# Patient Record
Sex: FEMALE | Race: WHITE | NOT HISPANIC OR LATINO | Employment: OTHER | URBAN - METROPOLITAN AREA
[De-identification: names, ages, dates, MRNs, and addresses within clinical notes are randomized per-mention and may not be internally consistent; named-entity substitution may affect disease eponyms.]

---

## 2021-09-14 ENCOUNTER — TRANSCRIBE ORDERS (OUTPATIENT)
Dept: ADMINISTRATIVE | Facility: HOSPITAL | Age: 76
End: 2021-09-14

## 2021-09-14 DIAGNOSIS — Z13.6 SCREENING FOR ISCHEMIC HEART DISEASE: Primary | ICD-10-CM

## 2021-09-24 ENCOUNTER — HOSPITAL ENCOUNTER (OUTPATIENT)
Dept: CT IMAGING | Facility: HOSPITAL | Age: 76
Discharge: HOME | End: 2021-09-24
Admitting: NURSE PRACTITIONER

## 2021-09-24 ENCOUNTER — APPOINTMENT (OUTPATIENT)
Dept: CT IMAGING | Facility: HOSPITAL | Age: 76
End: 2021-09-24

## 2021-09-24 DIAGNOSIS — Z13.6 SCREENING FOR ISCHEMIC HEART DISEASE: ICD-10-CM

## 2021-09-24 PROCEDURE — 75571 CT HRT W/O DYE W/CA TEST: CPT

## 2021-11-03 ENCOUNTER — OFFICE VISIT (OUTPATIENT)
Dept: CARDIOLOGY | Facility: CLINIC | Age: 76
End: 2021-11-03

## 2021-11-03 VITALS
SYSTOLIC BLOOD PRESSURE: 158 MMHG | DIASTOLIC BLOOD PRESSURE: 95 MMHG | BODY MASS INDEX: 28.16 KG/M2 | WEIGHT: 169 LBS | HEIGHT: 65 IN | HEART RATE: 72 BPM

## 2021-11-03 DIAGNOSIS — I10 ESSENTIAL HYPERTENSION: ICD-10-CM

## 2021-11-03 DIAGNOSIS — I25.10 CORONARY ARTERY DISEASE INVOLVING NATIVE CORONARY ARTERY OF NATIVE HEART WITHOUT ANGINA PECTORIS: Primary | ICD-10-CM

## 2021-11-03 DIAGNOSIS — E78.5 DYSLIPIDEMIA: ICD-10-CM

## 2021-11-03 PROBLEM — E11.9 TYPE 2 DIABETES MELLITUS: Status: ACTIVE | Noted: 2017-12-27

## 2021-11-03 PROCEDURE — 93000 ELECTROCARDIOGRAM COMPLETE: CPT | Performed by: INTERNAL MEDICINE

## 2021-11-03 PROCEDURE — 99204 OFFICE O/P NEW MOD 45 MIN: CPT | Performed by: INTERNAL MEDICINE

## 2021-11-03 RX ORDER — VALSARTAN AND HYDROCHLOROTHIAZIDE 160; 12.5 MG/1; MG/1
1 TABLET, FILM COATED ORAL DAILY
COMMUNITY
Start: 2021-11-01

## 2021-11-03 RX ORDER — SEMAGLUTIDE 1.34 MG/ML
INJECTION, SOLUTION SUBCUTANEOUS
COMMUNITY
Start: 2021-10-04 | End: 2022-11-16

## 2021-11-03 RX ORDER — MELATONIN 10 MG
CAPSULE ORAL NIGHTLY
COMMUNITY

## 2021-11-03 RX ORDER — METFORMIN HYDROCHLORIDE 500 MG/1
TABLET, EXTENDED RELEASE ORAL 2 TIMES DAILY
COMMUNITY
Start: 2021-11-01

## 2021-11-03 RX ORDER — PRAVASTATIN SODIUM 40 MG
40 TABLET ORAL DAILY
COMMUNITY
Start: 2021-11-01

## 2021-11-03 RX ORDER — METOPROLOL SUCCINATE 25 MG/1
25 TABLET, EXTENDED RELEASE ORAL DAILY
COMMUNITY
Start: 2021-11-01

## 2021-11-03 RX ORDER — DIPHENHYDRAMINE HCL 50 MG
50 CAPSULE ORAL NIGHTLY
COMMUNITY

## 2021-11-03 RX ORDER — LEVOTHYROXINE SODIUM 75 MCG
TABLET ORAL DAILY
COMMUNITY
Start: 2021-11-01

## 2021-11-03 RX ORDER — ASPIRIN 81 MG/1
81 TABLET ORAL DAILY
COMMUNITY

## 2021-11-03 RX ORDER — DENOSUMAB 60 MG/ML
1 INJECTION SUBCUTANEOUS
COMMUNITY

## 2021-11-03 RX ORDER — IBUPROFEN 600 MG/1
TABLET ORAL AS NEEDED
COMMUNITY

## 2021-11-03 NOTE — PROGRESS NOTES
Chief Complaint  Aortic Stenosis, Hypertension, and Hyperlipidemia      History of Present Illness  Misty Canales presents to [facility]  Patient is a 76-year-old female with a previous history of diabetes, dyslipidemia and hypertension who was undergone a CT scan screening purposes was found to have some coronary calcification.  She denies any ongoing anginal chest pain stable mild dyspnea on exertion symptoms.  Denies any PND orthopnea or other complaints    Past Medical History:   Diagnosis Date   • Diabetes mellitus (HCC)    • Dyslipidemia 11/3/2021   • Essential hypertension 3/17/2017   • Hyperlipidemia    • Hypertension    • Sleep apnea    • Type 2 diabetes mellitus (HCC) 12/27/2017         Current Outpatient Medications:   •  aspirin (aspirin) 81 MG EC tablet, Take 81 mg by mouth Daily., Disp: , Rfl:   •  denosumab (Prolia) 60 MG/ML solution prefilled syringe syringe, 1 mL., Disp: , Rfl:   •  diphenhydrAMINE (BENADRYL) 50 MG capsule, Take 50 mg by mouth Every Night., Disp: , Rfl:   •  ibuprofen (ADVIL,MOTRIN) 600 MG tablet, As Needed., Disp: , Rfl:   •  Melatonin 10 MG capsule, Take  by mouth Every Night., Disp: , Rfl:   •  metFORMIN ER (GLUCOPHAGE-XR) 500 MG 24 hr tablet, 2 (Two) Times a Day., Disp: , Rfl:   •  metoprolol succinate XL (TOPROL-XL) 25 MG 24 hr tablet, Take 25 mg by mouth Daily., Disp: , Rfl:   •  Ozempic, 0.25 or 0.5 MG/DOSE, 2 MG/1.5ML solution pen-injector, INJECT 0.25MG SUBCUTANEOUSLY ONCE WEEKLY, Disp: , Rfl:   •  pravastatin (PRAVACHOL) 40 MG tablet, Take 40 mg by mouth Daily., Disp: , Rfl:   •  Synthroid 75 MCG tablet, Daily., Disp: , Rfl:   •  valsartan-hydrochlorothiazide (DIOVAN-HCT) 160-12.5 MG per tablet, Take 1 tablet by mouth Daily., Disp: , Rfl:     There are no discontinued medications.  Allergies   Allergen Reactions   • Atorvastatin Other (See Comments)     Felt like she was dying   [place] awful        Social History     Tobacco Use   • Smoking status:  "Former Smoker     Packs/day: 0.50     Types: Cigarettes     Start date:      Quit date:      Years since quittin.8   • Smokeless tobacco: Never Used   • Tobacco comment: stopped in between    Vaping Use   • Vaping Use: Never used   Substance Use Topics   • Alcohol use: Never   • Drug use: Never       Family History   Problem Relation Age of Onset   • No Known Problems Mother    • Heart attack Father         Objective     /95   Pulse 72   Ht 165.1 cm (65\")   Wt 76.7 kg (169 lb)   BMI 28.12 kg/m²       Physical Exam    General Appearance:   · no acute distress  · Alert and oriented x3  HENT:   · lips not cyanotic  · Atraumatic  Neck:  · No jvd   · supple  Respiratory:  · no respiratory distress  · normal breath sounds  · no rales  Cardiovascular:  · Regular rate and rhythm  · no S3, no S4   · no murmur  · no rub  Extremities  · No cyanosis  · lower extremity edema: +1    Skin:   · warm, dry  · No rashes    Result Review :     No results found for: PROBNP    Creatinine 0.9  Potassium 3.9   Hemoglobin A1c 6.1    No results found for: TSH   No results found for: FREET4   No results found for: DDIMERQUANT  No results found for: MG   No results found for: DIGOXIN   No results found for: TROPONINT   No results found for: POCTROP(                    ECG 12 Lead    Date/Time: 11/3/2021 12:50 PM  Performed by: Rudi Hernandez MD  Authorized by: Rudi Hernandez MD   Comparison: not compared with previous ECG   Rhythm: sinus rhythm  Comments: Consider old anteroseptal wall MI           No results found for this or any previous visit.           10/21Echocardiogram EF 70%  Moderate left ventricular perjury  Mild left atrial lodgment    10/21  Nuclear stress test no reversible ischemia seen  EF 84%      CT calcium score   1. Total calcium score 109. Definite, at least moderate atherosclerotic plaque.  Mild coronary   artery disease highly likely, significant narrowings possible.  Recommend follow-up with " primary   care physician or cardiologist for risk factor modification and further workup as needed.      2. Additional findings as given above.        Diagnoses and all orders for this visit:    1. Coronary artery disease involving native coronary artery of native heart without angina pectoris (Primary)  Assessment & Plan:  Patient with a elevated coronary calcium score but no ongoing anginal discomfort.  She underwent stress testing which showed no ischemic burden.  No further testing is indicated at this point will continue with aspirin 81 mg preventatively as well as intensive statin lowering for goal LDL less than 70      2. Dyslipidemia  Assessment & Plan:  Patient is on statin Pravachol 40 mg daily tolerating well will obtain her most recent lipid levels titrating for a desired level of less than 70      3. Essential hypertension  Assessment & Plan:  Mild elevation in office at home patient states running in the 130s range encourage continued monitoring.  Counseled patient on  • low-sodium diet of less than 2 g  • Aerobic activity 30 minutes a day 5 times a week  • Weight loss          Other orders  -     ECG 12 Lead    Follow Up     Return in about 6 months (around 5/3/2022) for Follow with Alana Jane.          Patient was given instructions and counseling regarding her condition or for health maintenance advice. Please see specific information pulled into the AVS if appropriate.

## 2021-11-03 NOTE — ASSESSMENT & PLAN NOTE
Mild elevation in office at home patient states running in the 130s range encourage continued monitoring.  Counseled patient on  • low-sodium diet of less than 2 g  • Aerobic activity 30 minutes a day 5 times a week  • Weight loss

## 2021-11-03 NOTE — ASSESSMENT & PLAN NOTE
Patient is on statin Pravachol 40 mg daily tolerating well will obtain her most recent lipid levels titrating for a desired level of less than 70

## 2021-11-03 NOTE — ASSESSMENT & PLAN NOTE
Patient with a elevated coronary calcium score but no ongoing anginal discomfort.  She underwent stress testing which showed no ischemic burden.  No further testing is indicated at this point will continue with aspirin 81 mg preventatively as well as intensive statin lowering for goal LDL less than 70

## 2022-05-04 ENCOUNTER — OFFICE VISIT (OUTPATIENT)
Dept: CARDIOLOGY | Facility: CLINIC | Age: 77
End: 2022-05-04

## 2022-05-04 VITALS
SYSTOLIC BLOOD PRESSURE: 140 MMHG | DIASTOLIC BLOOD PRESSURE: 70 MMHG | HEART RATE: 76 BPM | BODY MASS INDEX: 29.49 KG/M2 | HEIGHT: 65 IN | WEIGHT: 177 LBS

## 2022-05-04 DIAGNOSIS — I25.10 CORONARY ARTERY DISEASE INVOLVING NATIVE CORONARY ARTERY OF NATIVE HEART WITHOUT ANGINA PECTORIS: Primary | ICD-10-CM

## 2022-05-04 DIAGNOSIS — I10 ESSENTIAL HYPERTENSION: ICD-10-CM

## 2022-05-04 DIAGNOSIS — E78.5 HYPERLIPIDEMIA LDL GOAL <70: ICD-10-CM

## 2022-05-04 PROBLEM — M19.90 OSTEOARTHRITIS: Status: ACTIVE | Noted: 2018-10-17

## 2022-05-04 PROBLEM — N28.1 RENAL CYST: Status: ACTIVE | Noted: 2019-09-11

## 2022-05-04 PROBLEM — Z86.010 HISTORY OF COLONIC POLYPS: Status: ACTIVE | Noted: 2022-03-31

## 2022-05-04 PROBLEM — M81.0 OSTEOPOROSIS: Status: ACTIVE | Noted: 2018-09-14

## 2022-05-04 PROBLEM — F41.9 ANXIETY: Status: ACTIVE | Noted: 2017-12-01

## 2022-05-04 PROBLEM — M48.061 SPINAL STENOSIS OF LUMBAR REGION: Status: ACTIVE | Noted: 2019-09-11

## 2022-05-04 PROBLEM — E03.9 HYPOTHYROIDISM: Status: ACTIVE | Noted: 2017-12-01

## 2022-05-04 PROCEDURE — 99213 OFFICE O/P EST LOW 20 MIN: CPT | Performed by: NURSE PRACTITIONER

## 2022-05-04 NOTE — PROGRESS NOTES
Chief Complaint  Coronary Artery Disease    Subjective            History of Present Illness  Misty Canales is a 77-year-old white/ female patient who presents to the office today for follow-up.  She has mild CAD, hypertension, and hyperlipidemia.  She reports compliance with all of her medications.  She denies any chest pain, shortness of breath, lightheadedness/dizziness, palpitations, or edema.  She brought home blood pressure log with her which shows controlled blood pressures.    PMH  Past Medical History:   Diagnosis Date   • CAD 11/3/2021    CT scan  1. Total calcium score 109. Definite, at least moderate atherosclerotic plaque.  Mild coronary  artery disease highly likely, significant narrowings possible.  Recommend follow-up with primary  care physician or cardiologist for risk factor modification and further workup as needed.    2. Additional findings as given above.  1. Total calcium score 109. Definite, at least moderate athe   • Diabetes mellitus (HCC)    • Dyslipidemia 11/3/2021   • Essential hypertension 3/17/2017   • Hyperlipidemia    • Hyperlipidemia LDL goal <70 11/3/2021   • Hypertension    • Sleep apnea    • Type 2 diabetes mellitus (HCC) 2017         ALLERGY  Allergies   Allergen Reactions   • Atorvastatin Other (See Comments)     Felt like she was dying   [place] awful          SURGICALHX  History reviewed. No pertinent surgical history.       SOC  Social History     Socioeconomic History   • Marital status:    Tobacco Use   • Smoking status: Former Smoker     Packs/day: 0.50     Types: Cigarettes     Start date:      Quit date: 2009     Years since quittin.3   • Smokeless tobacco: Never Used   • Tobacco comment: stopped in between    Vaping Use   • Vaping Use: Never used   Substance and Sexual Activity   • Alcohol use: Never   • Drug use: Never   • Sexual activity: Defer         FAMHX  Family History   Problem Relation Age of Onset   • No Known Problems Mother   "  • Heart attack Father           JAZZMINE  Current Outpatient Medications on File Prior to Visit   Medication Sig   • aspirin 81 MG EC tablet Take 81 mg by mouth Daily.   • denosumab (Prolia) 60 MG/ML solution prefilled syringe syringe 1 mL.   • diphenhydrAMINE (BENADRYL) 50 MG capsule Take 50 mg by mouth Every Night.   • ibuprofen (ADVIL,MOTRIN) 600 MG tablet As Needed.   • Melatonin 10 MG capsule Take  by mouth Every Night.   • metFORMIN ER (GLUCOPHAGE-XR) 500 MG 24 hr tablet 2 (Two) Times a Day.   • metoprolol succinate XL (TOPROL-XL) 25 MG 24 hr tablet Take 25 mg by mouth Daily.   • Ozempic, 0.25 or 0.5 MG/DOSE, 2 MG/1.5ML solution pen-injector INJECT 0.25MG SUBCUTANEOUSLY ONCE WEEKLY   • pravastatin (PRAVACHOL) 40 MG tablet Take 40 mg by mouth Daily.   • Synthroid 75 MCG tablet Daily.   • valsartan-hydrochlorothiazide (DIOVAN-HCT) 160-12.5 MG per tablet Take 1 tablet by mouth Daily.     No current facility-administered medications on file prior to visit.         Objective   /70   Pulse 76   Ht 165.1 cm (65\")   Wt 80.3 kg (177 lb)   BMI 29.45 kg/m²       Physical Exam  HENT:      Head: Normocephalic.   Neck:      Vascular: No carotid bruit.   Cardiovascular:      Rate and Rhythm: Normal rate and regular rhythm.      Pulses: Normal pulses.      Heart sounds: Normal heart sounds. No murmur heard.  Pulmonary:      Effort: Pulmonary effort is normal.      Breath sounds: Normal breath sounds.   Musculoskeletal:      Cervical back: Neck supple.      Right lower leg: No edema.      Left lower leg: No edema.   Skin:     General: Skin is dry.      Capillary Refill: Capillary refill takes less than 2 seconds.   Neurological:      Mental Status: She is alert and oriented to person, place, and time.   Psychiatric:         Behavior: Behavior normal.       Result Review :   The following data was reviewed by: MICHELLE Antony on 05/04/2022:  No results found for: PROBNP     No results found for: TSH   No " results found for: FREET4   No results found for: DDIMERQUANT  No results found for: MG   No results found for: DIGOXIN   No results found for: TROPONINT        Most recent labs requested from PCP       Assessment and Plan    Diagnoses and all orders for this visit:    1. CAD (Primary)  Currently denies any anginal symptoms, continue aspirin 81 mg daily.    2. Essential hypertension  Elevated in office but controlled at home, continue metoprolol 25 mg daily and valsartan HCTZ 160/12.5 mg daily.    3. Hyperlipidemia LDL goal <70  No recent lipid panel for review, will request from PCP.  For now continue pravastatin 40 mg daily          Follow Up   Return in about 6 months (around 11/4/2022) for Follow up with Dr Hernandez.    Patient was given instructions and counseling regarding her condition or for health maintenance advice. Please see specific information pulled into the AVS if appropriate.     Misty Canales  reports that she quit smoking about 13 years ago. Her smoking use included cigarettes. She started smoking about 55 years ago. She smoked 0.50 packs per day. She has never used smokeless tobacco.           Alana Jane, APRN  05/04/22  08:02 EDT    Dictated Utilizing Dragon Dictation

## 2022-11-16 ENCOUNTER — OFFICE VISIT (OUTPATIENT)
Dept: CARDIOLOGY | Facility: CLINIC | Age: 77
End: 2022-11-16

## 2022-11-16 VITALS
DIASTOLIC BLOOD PRESSURE: 78 MMHG | HEART RATE: 69 BPM | WEIGHT: 174.2 LBS | BODY MASS INDEX: 29.02 KG/M2 | HEIGHT: 65 IN | SYSTOLIC BLOOD PRESSURE: 144 MMHG

## 2022-11-16 DIAGNOSIS — I10 ESSENTIAL HYPERTENSION: ICD-10-CM

## 2022-11-16 DIAGNOSIS — I25.10 CORONARY ARTERY DISEASE INVOLVING NATIVE CORONARY ARTERY OF NATIVE HEART WITHOUT ANGINA PECTORIS: Primary | ICD-10-CM

## 2022-11-16 DIAGNOSIS — E78.5 HYPERLIPIDEMIA LDL GOAL <70: ICD-10-CM

## 2022-11-16 PROCEDURE — 99214 OFFICE O/P EST MOD 30 MIN: CPT | Performed by: INTERNAL MEDICINE

## 2022-11-16 NOTE — ASSESSMENT & PLAN NOTE
Continue with Pravachol 40 nightly attempting to obtain most recent LDL levels from her PCP we will see if further adjustment needs to be done goal LDL less than 70

## 2022-11-16 NOTE — ASSESSMENT & PLAN NOTE
Controlled at home recommended to keep a blood pressure log for review on next visit.  Continue on Toprol 25 mg once a day and valsartan HCTZ 100/12.5 daily

## 2022-11-16 NOTE — PROGRESS NOTES
Chief Complaint  Coronary Artery Disease, Follow-up, Hyperlipidemia, and Hypertension    Subjective    Patient with no ongoing anginal chest pain denies any change in her breathing capacity.    Past Medical History:   Diagnosis Date   • CAD 11/3/2021    CT scan 9/21 1. Total calcium score 109. Definite, at least moderate atherosclerotic plaque.  Mild coronary  artery disease highly likely, significant narrowings possible.  Recommend follow-up with primary  care physician or cardiologist for risk factor modification and further workup as needed.    2. Additional findings as given above.  1. Total calcium score 109. Definite, at least moderate athe   • Diabetes mellitus (HCC)    • Dyslipidemia 11/3/2021   • Essential hypertension 3/17/2017   • Hyperlipidemia    • Hyperlipidemia LDL goal <70 11/3/2021   • Hypertension    • Sleep apnea    • Type 2 diabetes mellitus (HCC) 12/27/2017         Current Outpatient Medications:   •  aspirin 81 MG EC tablet, Take 81 mg by mouth Daily., Disp: , Rfl:   •  denosumab (Prolia) 60 MG/ML solution prefilled syringe syringe, 1 mL., Disp: , Rfl:   •  diphenhydrAMINE (BENADRYL) 50 MG capsule, Take 50 mg by mouth Every Night., Disp: , Rfl:   •  ibuprofen (ADVIL,MOTRIN) 600 MG tablet, As Needed., Disp: , Rfl:   •  Melatonin 10 MG capsule, Take  by mouth Every Night., Disp: , Rfl:   •  metFORMIN ER (GLUCOPHAGE-XR) 500 MG 24 hr tablet, 2 (Two) Times a Day., Disp: , Rfl:   •  metoprolol succinate XL (TOPROL-XL) 25 MG 24 hr tablet, Take 25 mg by mouth Daily., Disp: , Rfl:   •  pravastatin (PRAVACHOL) 40 MG tablet, Take 40 mg by mouth Daily., Disp: , Rfl:   •  Synthroid 75 MCG tablet, Daily., Disp: , Rfl:   •  valsartan-hydrochlorothiazide (DIOVAN-HCT) 160-12.5 MG per tablet, Take 1 tablet by mouth Daily., Disp: , Rfl:     Medications Discontinued During This Encounter   Medication Reason   • Ozempic, 0.25 or 0.5 MG/DOSE, 2 MG/1.5ML solution pen-injector *Therapy completed     Allergies  "  Allergen Reactions   • Atorvastatin Other (See Comments)     Felt like she was dying   [place] awful        Social History     Tobacco Use   • Smoking status: Former     Packs/day: 0.50     Types: Cigarettes     Start date:      Quit date:      Years since quittin.8   • Smokeless tobacco: Never   • Tobacco comments:     stopped in between    Vaping Use   • Vaping Use: Never used   Substance Use Topics   • Alcohol use: Never   • Drug use: Never       Family History   Problem Relation Age of Onset   • No Known Problems Mother    • Heart attack Father         Objective     /78   Pulse 69   Ht 165.1 cm (65\")   Wt 79 kg (174 lb 3.2 oz)   BMI 28.99 kg/m²       Physical Exam    General Appearance:   · no acute distress  · Alert and oriented x3  HENT:   · lips not cyanotic  · Atraumatic  Neck:  · No jvd   · supple  Respiratory:  · no respiratory distress  · normal breath sounds  · no rales  Cardiovascular:  · Regular rate and rhythm  · no S3, no S4   · no murmur  · no rub  Extremities  · No cyanosis  · lower extremity edema: none    Skin:   · warm, dry  · No rashes      Result Review :     No results found for: PROBNP                     Diagnoses and all orders for this visit:    1. CAD (Primary)  Assessment & Plan:  Patient is doing well no ongoing angina continue with chronic aspirin 81 mg daily        2. Essential hypertension  Assessment & Plan:  Controlled at home recommended to keep a blood pressure log for review on next visit.  Continue on Toprol 25 mg once a day and valsartan HCTZ 100/12.5 daily      3. Hyperlipidemia LDL goal <70  Assessment & Plan:  Continue with Pravachol 40 nightly attempting to obtain most recent LDL levels from her PCP we will see if further adjustment needs to be done goal LDL less than 70    Orders:  -     Lipid Panel; Future  -     Hepatic Function Panel; Future          Follow Up     Return in about 6 months (around 2023) for Follow with Alana Jane, EKG with " F/U.          Patient was given instructions and counseling regarding her condition or for health maintenance advice. Please see specific information pulled into the AVS if appropriate.